# Patient Record
Sex: FEMALE | Race: WHITE
[De-identification: names, ages, dates, MRNs, and addresses within clinical notes are randomized per-mention and may not be internally consistent; named-entity substitution may affect disease eponyms.]

---

## 2019-06-09 ENCOUNTER — HOSPITAL ENCOUNTER (INPATIENT)
Dept: HOSPITAL 74 - JLDR | Age: 34
LOS: 3 days | Discharge: HOME | DRG: 560 | End: 2019-06-12
Attending: OBSTETRICS & GYNECOLOGY | Admitting: OBSTETRICS & GYNECOLOGY
Payer: COMMERCIAL

## 2019-06-09 VITALS — BODY MASS INDEX: 29.2 KG/M2

## 2019-06-09 DIAGNOSIS — Z3A.40: ICD-10-CM

## 2019-06-09 LAB
ANION GAP SERPL CALC-SCNC: 9 MMOL/L (ref 8–16)
APTT BLD: 29.3 SECONDS (ref 25.2–36.5)
BASOPHILS # BLD: 0.5 % (ref 0–2)
BUN SERPL-MCNC: 10.9 MG/DL (ref 7–18)
CALCIUM SERPL-MCNC: 8.5 MG/DL (ref 8.5–10.1)
CHLORIDE SERPL-SCNC: 106 MMOL/L (ref 98–107)
CO2 SERPL-SCNC: 23 MMOL/L (ref 21–32)
CREAT SERPL-MCNC: 0.8 MG/DL (ref 0.55–1.3)
DEPRECATED RDW RBC AUTO: 13.5 % (ref 11.6–15.6)
EOSINOPHIL # BLD: 1.2 % (ref 0–4.5)
GLUCOSE SERPL-MCNC: 133 MG/DL (ref 74–106)
HCT VFR BLD CALC: 34.2 % (ref 32.4–45.2)
HGB BLD-MCNC: 11.5 GM/DL (ref 10.7–15.3)
INR BLD: 0.87 (ref 0.83–1.09)
LYMPHOCYTES # BLD: 23.9 % (ref 8–40)
MCH RBC QN AUTO: 29.4 PG (ref 25.7–33.7)
MCHC RBC AUTO-ENTMCNC: 33.6 G/DL (ref 32–36)
MCV RBC: 87.5 FL (ref 80–96)
MONOCYTES # BLD AUTO: 7.2 % (ref 3.8–10.2)
NEUTROPHILS # BLD: 67.2 % (ref 42.8–82.8)
PLATELET # BLD AUTO: 233 K/MM3 (ref 134–434)
PLATELET BLD QL SMEAR: ADEQUATE
PMV BLD: 9.7 FL (ref 7.5–11.1)
POTASSIUM SERPLBLD-SCNC: 3.6 MMOL/L (ref 3.5–5.1)
PT PNL PPP: 10.2 SEC (ref 9.7–13)
RBC # BLD AUTO: 3.91 M/MM3 (ref 3.6–5.2)
SODIUM SERPL-SCNC: 138 MMOL/L (ref 136–145)
WBC # BLD AUTO: 8.5 K/MM3 (ref 4–10)

## 2019-06-09 RX ADMIN — Medication SCH MLS/HR: at 21:10

## 2019-06-09 RX ADMIN — SODIUM CHLORIDE, SODIUM GLUCONATE, SODIUM ACETATE, POTASSIUM CHLORIDE, AND MAGNESIUM CHLORIDE SCH MLS/HR: 526; 502; 368; 37; 30 INJECTION, SOLUTION INTRAVENOUS at 21:50

## 2019-06-09 NOTE — HP
Past Medical History





- Primary Care Physician


PCP:: Eric Pichardo E





- Admission


Chief Complaint: In labor


History of Present Illness: 





Comtractions x 2 years.


History Source: Patient





- Past Medical History


...: 4


...Para: 1


...Term: 1


...: 0


...Spon : 0


...Induced : 2


...Multiple Gestation: 0


...LMP: 18


... Weeks Gestation by Dates: 39.6


...EDC by Dates: 06/10/19





- Smoking History


Smoking history: Never smoked


Have you smoked in the past 12 months: No





- Alcohol/Substance Use


Hx Alcohol Use: No





- Social History


Usual Living Arrangement: Yes: With Spouse


History of Recent Travel: No





Home Medications





- Allergies


Allergies/Adverse Reactions: 


 Allergies











Allergy/AdvReac Type Severity Reaction Status Date / Time


 


No Known Allergies Allergy   Verified 06/10/19 00:59














- Home Medications


Home Medications: 


Ambulatory Orders





No Home Medications 0 dose .ROUTE UTDICT 14 











Physical Exam - Maternity


Vital Signs: 


 Vital Signs











Temperature  98.4 F   19 20:00


 


Pulse Rate  83   19 21:00


 


Respiratory Rate  20   19 21:00


 


Blood Pressure  128/81   19 21:00


 


O2 Sat by Pulse Oximetry (%)      











Constitutional: Yes: Well Nourished


Eyes: Yes: WNL


HENT: Yes: WNL


Neck: Yes: WNL


Cardiovascular: Yes: WNL


Lungs: Clear to auscultation


Breast(s): Yes: WNL





- Abdominal Exam/OB


Number of Fetuses: Single


Fetal Presentation: Vertex


Contractions: Yes


Regularity: Regular


Intensity: Mild/Mod


Fetal Monitor Mode: External


Accelerations: Uniform





- Vaginal Exam/OB


Vaginal Bleediing: No


Dilatation (cm): 2


Effacement (%): 50


Amniotic Membrane Status: Bulging


Fetal Presentation: Vertex/Position


Fetal Station: -2





- Physical Exam


Musculoskeletal: Yes: WNL


Extremities: Yes: WNL


Edema: No


...Motor Strength: WNL


Psychiatric: Yes: WNL





- Labs


Lab Results: 


 CBC, BMP





 19 19:55 





 19 19:55 











Assessment/Plan





Impression: Intrauterine pregnancy at term early labor.





Plan: Allow labor to continue. Rupture of membranes. Considering Pitocin 

augmentation. Anesthesia discussed. Vaginal delivery is planned.

## 2019-06-09 NOTE — PN
Progress Note, Labor


  ** Vaginal Exam #1


Labor Exam Date: 06/09/19


Labor Exam Time: 21:05


Fetal Heart Rate (range): 140


Dilatation: 2


Effacement (%): 50%


Amniotic Membrane Status: Bulging


Fetal Presentation: Vertex/Position


Fetal Station: -2 (Active ctx. Given Stadol and Phenergan. AROM clear. Low dose 

Pitocin augmentation.)

## 2019-06-09 NOTE — PN
Progress Note, Labor


  ** Vaginal Exam #2


Labor Exam Date: 06/09/19


Labor Exam Time: 21:40


Dilatation: 4


Effacement (%): 80


Amniotic Membrane Status: Ruptured


Fetal Station: -2 (Cont. epidural in place. Few late decels. Good variability. 

Continue same management.)

## 2019-06-10 PROCEDURE — 0HQ9XZZ REPAIR PERINEUM SKIN, EXTERNAL APPROACH: ICD-10-PCS | Performed by: OBSTETRICS & GYNECOLOGY

## 2019-06-10 RX ADMIN — AMPICILLIN SODIUM SCH MLS/HR: 2 INJECTION, POWDER, FOR SOLUTION INTRAMUSCULAR; INTRAVENOUS at 15:24

## 2019-06-10 RX ADMIN — IBUPROFEN PRN MG: 600 TABLET, FILM COATED ORAL at 11:30

## 2019-06-10 RX ADMIN — ACETAMINOPHEN PRN MG: 325 TABLET ORAL at 11:31

## 2019-06-10 RX ADMIN — IBUPROFEN PRN MG: 600 TABLET, FILM COATED ORAL at 04:30

## 2019-06-10 RX ADMIN — ACETAMINOPHEN PRN MG: 325 TABLET ORAL at 04:30

## 2019-06-10 RX ADMIN — AMPICILLIN SODIUM SCH MLS/HR: 2 INJECTION, POWDER, FOR SOLUTION INTRAMUSCULAR; INTRAVENOUS at 20:59

## 2019-06-10 RX ADMIN — AMPICILLIN SODIUM SCH MLS/HR: 2 INJECTION, POWDER, FOR SOLUTION INTRAMUSCULAR; INTRAVENOUS at 08:38

## 2019-06-10 RX ADMIN — SODIUM CHLORIDE, SODIUM GLUCONATE, SODIUM ACETATE, POTASSIUM CHLORIDE, AND MAGNESIUM CHLORIDE SCH MLS/HR: 526; 502; 368; 37; 30 INJECTION, SOLUTION INTRAVENOUS at 01:21

## 2019-06-10 NOTE — PN
Progress Note, Labor


  ** Vaginal Exam #3


Labor Exam Date: 06/10/19


Labor Exam Time: 03:10


Dilatation: fully


Amniotic Membrane Status: Ruptured


Fetal Station: -2 (Fully dilated. ASHLEY. T - 101.3. Starting to push. Given 2 G 

of Ampicillin.)

## 2019-06-10 NOTE — PN
Delivery





- Delivery


Vaginal Delivery: No Problems (NVSD, boy, Ap. 8 & 9. Delayed cord clamping. 

Placenta spont., intact.)


Episiotomy/Laceration: Periurethral Extnsion/lac, Vaginal Extension/lac, 1st 

degree, 3rd degree (NVD, boy Apgars 8 & 9.)





Delivery, Single Birth





-  Feeding Plan


Initial Plan: Elected not to breastfeed exclusively throughout hospitalization

## 2019-06-10 NOTE — PN
Post Partum Progress Note


Post Partum Day: 0


Type of Delivery: 


Vital Signs: 


 Vital Signs











Temperature  97.8 F   06/10/19 13:12


 


Pulse Rate  69   06/10/19 13:12


 


Respiratory Rate  20   06/10/19 13:12


 


Blood Pressure  128/65   06/10/19 13:12


 


O2 Sat by Pulse Oximetry (%)  100   06/10/19 05:30











Breast Exam: Yes: Soft


Uterus: Yes: Fundus Firm


Abdomen/GI: Yes: Abdomen soft, Tolerating PO


Lochia: Yes: Rubra


Lochia, amount: Moderate


Extremities: Yes: Calves non-tender


Activity: Ambulating (Pt. is doing well. Bonding with the baby. Planning to  

breast-feed primarily.)





- Labs


Labs: 


 CBC











WBC  8.5 K/mm3 (4.0-10.0)   19  19:55    


 


RBC  3.91 M/mm3 (3.60-5.2)   19  19:55    


 


Hgb  11.5 GM/dL (10.7-15.3)   19  19:55    


 


Hct  34.2 % (32.4-45.2)   19  19:55    


 


MCV  87.5 fl (80-96)   19  19:55    


 


MCH  29.4 pg (25.7-33.7)  D 19  19:55    


 


MCHC  33.6 g/dl (32.0-36.0)   19  19:55    


 


RDW  13.5 % (11.6-15.6)  D 19  19:55    


 


Plt Count  233 K/MM3 (134-434)   19  19:55    


 


MPV  9.7 fl (7.5-11.1)  D 19  19:55    


 


Absolute Neuts (auto)  5.7 K/mm3 (1.5-8.0)   19  19:55    


 


Neutrophils %  67.2 % (42.8-82.8)   19  19:55    


 


Lymphocytes %  23.9 % (8-40)   19  19:55    


 


Monocytes %  7.2 % (3.8-10.2)   19  19:55    


 


Eosinophils %  1.2 % (0-4.5)   19  19:55    


 


Basophils %  0.5 % (0-2.0)   19  19:55    


 


Nucleated RBC %  0 % (0-0)   19  19:55    


 


Platelet Estimate  Adequate   19  19:55    


 


Platelet Comment  Reviewed   19  19:55    











Other Findings, Remarks: 





Circumcision discussed with patient in details. Pros and cons as well as all 

possible risks and complications reviewed. Local analgesia explained.


Patient wants to have a circumcision done. We will do it tomorrow.

## 2019-06-11 LAB
BASOPHILS # BLD: 0.2 % (ref 0–2)
DEPRECATED RDW RBC AUTO: 13.8 % (ref 11.6–15.6)
EOSINOPHIL # BLD: 2.4 % (ref 0–4.5)
HCT VFR BLD CALC: 30.3 % (ref 32.4–45.2)
HGB BLD-MCNC: 10.2 GM/DL (ref 10.7–15.3)
LYMPHOCYTES # BLD: 21 % (ref 8–40)
MCH RBC QN AUTO: 29.6 PG (ref 25.7–33.7)
MCHC RBC AUTO-ENTMCNC: 33.8 G/DL (ref 32–36)
MCV RBC: 87.6 FL (ref 80–96)
MONOCYTES # BLD AUTO: 7.3 % (ref 3.8–10.2)
NEUTROPHILS # BLD: 69.1 % (ref 42.8–82.8)
PLATELET # BLD AUTO: 201 K/MM3 (ref 134–434)
PMV BLD: 9.5 FL (ref 7.5–11.1)
RBC # BLD AUTO: 3.46 M/MM3 (ref 3.6–5.2)
WBC # BLD AUTO: 9.7 K/MM3 (ref 4–10)

## 2019-06-11 RX ADMIN — ACETAMINOPHEN PRN MG: 325 TABLET ORAL at 08:49

## 2019-06-11 RX ADMIN — AMPICILLIN SODIUM SCH MLS/HR: 2 INJECTION, POWDER, FOR SOLUTION INTRAMUSCULAR; INTRAVENOUS at 03:35

## 2019-06-11 RX ADMIN — IBUPROFEN PRN MG: 600 TABLET, FILM COATED ORAL at 20:38

## 2019-06-11 RX ADMIN — ACETAMINOPHEN PRN MG: 325 TABLET ORAL at 20:38

## 2019-06-11 RX ADMIN — IBUPROFEN PRN MG: 600 TABLET, FILM COATED ORAL at 08:48

## 2019-06-11 NOTE — PN
Post Partum Progress Note


Post Partum Day: 1


Type of Delivery: 


Vital Signs: 


 Vital Signs











Temperature  98.6 F   19 07:40


 


Pulse Rate  94 H  19 07:40


 


Respiratory Rate  18   19 07:40


 


Blood Pressure  125/75   19 07:40


 


O2 Sat by Pulse Oximetry (%)  100   06/10/19 05:30











Breast Exam: Yes: Soft


Uterus: Yes: Fundus Firm


Lochia: Yes: Rubra (Recovering nicely. Nursing.)


Lochia, amount: Small


Perineum: Yes: Episiotomy





- Labs


Labs: 


 CBC











WBC  9.7 K/mm3 (4.0-10.0)   19  06:00    


 


RBC  3.46 M/mm3 (3.60-5.2)  L  19  06:00    


 


Hgb  10.2 GM/dL (10.7-15.3)  L  19  06:00    


 


Hct  30.3 % (32.4-45.2)  L  19  06:00    


 


MCV  87.6 fl (80-96)   19  06:00    


 


MCH  29.6 pg (25.7-33.7)   19  06:00    


 


MCHC  33.8 g/dl (32.0-36.0)   19  06:00    


 


RDW  13.8 % (11.6-15.6)   19  06:00    


 


Plt Count  201 K/MM3 (134-434)   19  06:00    


 


MPV  9.5 fl (7.5-11.1)   19  06:00    


 


Absolute Neuts (auto)  6.7 K/mm3 (1.5-8.0)   19  06:00    


 


Neutrophils %  69.1 % (42.8-82.8)   19  06:00    


 


Lymphocytes %  21.0 % (8-40)   19  06:00    


 


Monocytes %  7.3 % (3.8-10.2)   19  06:00    


 


Eosinophils %  2.4 % (0-4.5)  D 19  06:00    


 


Basophils %  0.2 % (0-2.0)   19  06:00    


 


Nucleated RBC %  0 % (0-0)   19  06:00    


 


Platelet Estimate  Adequate   19  19:55    


 


Platelet Comment  Reviewed   19  19:55    











Other Findings, Remarks: 





Patient is recovering nicely. Very happy,


Discharge tomorrow.

## 2019-06-12 VITALS — TEMPERATURE: 97.9 F | DIASTOLIC BLOOD PRESSURE: 72 MMHG | HEART RATE: 202 BPM | SYSTOLIC BLOOD PRESSURE: 131 MMHG

## 2019-06-12 RX ADMIN — Medication SCH: at 00:54

## 2019-06-12 RX ADMIN — IBUPROFEN PRN MG: 600 TABLET, FILM COATED ORAL at 08:25

## 2019-06-12 RX ADMIN — ACETAMINOPHEN PRN MG: 325 TABLET ORAL at 08:25

## 2019-06-12 RX ADMIN — Medication SCH: at 00:55

## 2019-06-12 RX ADMIN — SODIUM CHLORIDE, SODIUM GLUCONATE, SODIUM ACETATE, POTASSIUM CHLORIDE, AND MAGNESIUM CHLORIDE SCH: 526; 502; 368; 37; 30 INJECTION, SOLUTION INTRAVENOUS at 00:54
